# Patient Record
(demographics unavailable — no encounter records)

---

## 2024-11-22 NOTE — DISCUSSION/SUMMARY
[Normal Growth] : growth [Normal Development] : development  [No Elimination Concerns] : elimination [Continue Regimen] : feeding [No Skin Concerns] : skin [Normal Sleep Pattern] : sleep [None] : no medical problems [Anticipatory Guidance Given] : Anticipatory guidance addressed as per the history of present illness section [No Vaccines] : no vaccines needed [No Medications] : ~He/She~ is not on any medications [Patient] : patient [Parent/Guardian] : Parent/Guardian [Full Activity without restrictions including Physical Education & Athletics] : Full Activity without restrictions including Physical Education & Athletics [I have examined the above-named student and completed the preparticipation physical evaluation. The athlete does not present apparent clinical contraindications to practice and participate in sport(s) as outlined above. A copy of the physical exam is on r] : I have examined the above-named student and completed the preparticipation physical evaluation. The athlete does not present apparent clinical contraindications to practice and participate in sport(s) as outlined above. A copy of the physical exam is on record in my office and can be made available to the school at the request of the parents. If conditions arise after the athlete has been cleared for participation, the physician may rescind the clearance until the problem is resolved and the potential consequences are completely explained to the athlete (and parents/guardians). [] : The components of the vaccine(s) to be administered today are listed in the plan of care. The disease(s) for which the vaccine(s) are intended to prevent and the risks have been discussed with the caretaker.  The risks are also included in the appropriate vaccination information statements which have been provided to the patient's caregiver.  The caregiver has given consent to vaccinate. [FreeTextEntry1] : Continue balanced diet with all food groups.  Brush teeth twice a day with toothbrush. Recommend visit to dentist.  Maintain consistent daily routines and sleep schedule.  Personal hygiene, puberty, and sexual health reviewed.  Risky behaviors assessed. School discussed.  Limit screen time to no more than 2 hours per day. Encourage physical activity. Labs as discussed  MCV4 administered HPV 9 and COVID vaccines discussed and declined  Patient to send cardio eval s/p visit, also made referral in office  Return 1 year for routine well child check.

## 2024-11-22 NOTE — PHYSICAL EXAM

## 2024-11-22 NOTE — HISTORY OF PRESENT ILLNESS
[Father] : father [Yes] : Patient goes to dentist yearly [Toothpaste] : Primary Fluoride Source: Toothpaste [COVID-19] : COVID-19 [HPV] : HPV [Needs Immunizations] : Needs immunizations [Eats meals with family] : eats meals with family [Has family members/adults to turn to for help] : has family members/adults to turn to for help [Is permitted and is able to make independent decisions] : Is permitted and is able to make independent decisions [Sleep Concerns] : no sleep concerns [Grade: ____] : Grade: [unfilled] [Normal Performance] : normal performance [Normal Behavior/Attention] : normal behavior/attention [Normal Homework] : normal homework [Eats regular meals including adequate fruits and vegetables] : eats regular meals including adequate fruits and vegetables [Drinks non-sweetened liquids] : drinks non-sweetened liquids  [Calcium source] : calcium source [Has concerns about body or appearance] : does not have concerns about body or appearance [Has friends] : has friends [At least 1 hour of physical activity a day] : at least 1 hour of physical activity a day [Screen time (except homework) less than 2 hours a day] : no screen time (except homework) less than 2 hours a day [Has interests/participates in community activities/volunteers] : has interests/participates in community activities/volunteers. [Uses electronic nicotine delivery system] : does not use electronic nicotine delivery system [Exposure to electronic nicotine delivery system] : no exposure to electronic nicotine delivery system [Uses tobacco] : does not use tobacco [Exposure to tobacco] : no exposure to tobacco [Uses drugs] : does not use drugs  [Exposure to drugs] : no exposure to drugs [Drinks alcohol] : does not drink alcohol [Exposure to alcohol] : no exposure to alcohol [Uses safety belts/safety equipment] : uses safety belts/safety equipment  [Impaired/distracted driving] : no impaired/distracted driving [Has peer relationships free of violence] : has peer relationships free of violence [No] : Patient has not had sexual intercourse [Has ways to cope with stress] : has ways to cope with stress [Displays self-confidence] : displays self-confidence [Has problems with sleep] : does not have problems with sleep [Gets depressed, anxious, or irritable/has mood swings] : does not get depressed, anxious, or irritable/has mood swings [Has thought about hurting self or considered suicide] : has not thought about hurting self or considered suicide [With Teen] : teen [de-identified] : ~90s, pt aspires to go to [employer]  [de-identified] : competitive swimmer [NO] : No [FreeTextEntry1] : patient reports that every 2-3 months he feels like his heart flutter ~ 3 seconds, always at rest. denies SOB, chest pain, light headedness. dad denies family hx of cardiac issues. pt reports he has a cardiac clearance scheduled in [place]

## 2025-01-10 NOTE — REVIEW OF SYSTEMS
[Headache] : no headache [Eye Discharge] : no eye discharge [Eye Redness] : no eye redness [Ear Pain] : no ear pain [Nasal Discharge] : nasal discharge [Sinus Pressure] : sinus pressure [Sore Throat] : sore throat [Tachypnea] : not tachypneic [Wheezing] : no wheezing [Cough] : cough [Shortness of Breath] : no shortness of breath [Negative] : Genitourinary

## 2025-01-10 NOTE — HISTORY OF PRESENT ILLNESS
[de-identified] : congestion [FreeTextEntry6] : 16 year old boy BIB mother with c/o cough, sore throat and nasal congestion for about 1 week. Sinuses were irritated after swim meet yesterday. No headache or sinus pain now. Clear nasal discharge. No fever. No SOB, difficulty breathing, chest pain, stridor or wheeze. No n/v/d. No abdominal pain, sore throat or rash. No body aches or fatigue. No urinary complaints. Good po/uop/bm. Normal sleep and activity.

## 2025-01-10 NOTE — DISCUSSION/SUMMARY
[FreeTextEntry1] : Anticipatory guidance and parent education given. Symptoms likely due to viral URI. May use Tylenol or Ibuprofen as needed for fever or discomfort. Recommend supportive care including humidified air, increased fluids, rest, and nasal saline followed by nasal suction. Nasal saline rinses as tolerated. May use Flonase 1-2 sprays each nostril daily. Return if symptoms worsen or persist.